# Patient Record
Sex: MALE | Race: WHITE | NOT HISPANIC OR LATINO
[De-identification: names, ages, dates, MRNs, and addresses within clinical notes are randomized per-mention and may not be internally consistent; named-entity substitution may affect disease eponyms.]

---

## 2018-02-13 ENCOUNTER — RX RENEWAL (OUTPATIENT)
Age: 53
End: 2018-02-13

## 2018-02-15 ENCOUNTER — FORM ENCOUNTER (OUTPATIENT)
Age: 53
End: 2018-02-15

## 2018-02-16 ENCOUNTER — OUTPATIENT (OUTPATIENT)
Dept: OUTPATIENT SERVICES | Facility: HOSPITAL | Age: 53
LOS: 1 days | End: 2018-02-16
Payer: COMMERCIAL

## 2018-02-16 ENCOUNTER — APPOINTMENT (OUTPATIENT)
Dept: ORTHOPEDIC SURGERY | Facility: CLINIC | Age: 53
End: 2018-02-16
Payer: SELF-PAY

## 2018-02-16 VITALS
WEIGHT: 175 LBS | BODY MASS INDEX: 25.05 KG/M2 | HEIGHT: 70 IN | DIASTOLIC BLOOD PRESSURE: 80 MMHG | SYSTOLIC BLOOD PRESSURE: 120 MMHG

## 2018-02-16 PROCEDURE — 73564 X-RAY EXAM KNEE 4 OR MORE: CPT

## 2018-02-16 PROCEDURE — 73564 X-RAY EXAM KNEE 4 OR MORE: CPT | Mod: 26,50

## 2018-02-16 PROCEDURE — 99214 OFFICE O/P EST MOD 30 MIN: CPT

## 2018-02-22 ENCOUNTER — APPOINTMENT (OUTPATIENT)
Dept: ORTHOPEDIC SURGERY | Facility: CLINIC | Age: 53
End: 2018-02-22
Payer: COMMERCIAL

## 2018-02-22 VITALS
BODY MASS INDEX: 25.05 KG/M2 | WEIGHT: 175 LBS | DIASTOLIC BLOOD PRESSURE: 70 MMHG | SYSTOLIC BLOOD PRESSURE: 120 MMHG | HEIGHT: 70 IN

## 2018-02-22 PROCEDURE — 99214 OFFICE O/P EST MOD 30 MIN: CPT | Mod: 25

## 2018-02-22 PROCEDURE — 20610 DRAIN/INJ JOINT/BURSA W/O US: CPT | Mod: LT

## 2019-09-06 ENCOUNTER — RX RENEWAL (OUTPATIENT)
Age: 54
End: 2019-09-06

## 2019-09-17 ENCOUNTER — APPOINTMENT (OUTPATIENT)
Dept: ORTHOPEDIC SURGERY | Facility: CLINIC | Age: 54
End: 2019-09-17

## 2019-10-22 ENCOUNTER — APPOINTMENT (OUTPATIENT)
Dept: ORTHOPEDIC SURGERY | Facility: CLINIC | Age: 54
End: 2019-10-22
Payer: COMMERCIAL

## 2019-10-22 VITALS
BODY MASS INDEX: 25.2 KG/M2 | SYSTOLIC BLOOD PRESSURE: 120 MMHG | HEIGHT: 70 IN | WEIGHT: 176 LBS | DIASTOLIC BLOOD PRESSURE: 70 MMHG

## 2019-10-22 DIAGNOSIS — M23.8X9 OTHER INTERNAL DERANGEMENTS OF UNSPECIFIED KNEE: ICD-10-CM

## 2019-10-22 PROCEDURE — 20610 DRAIN/INJ JOINT/BURSA W/O US: CPT | Mod: RT

## 2019-10-22 RX ORDER — HYALURONATE SOD, CROSS-LINKED 30 MG/3 ML
30 SYRINGE (ML) INTRAARTICULAR
Qty: 1 | Refills: 0 | Status: COMPLETED | COMMUNITY
Start: 2019-09-06 | End: 2019-10-22

## 2019-10-22 RX ORDER — HYALURONATE SOD, CROSS-LINKED 30 MG/3 ML
30 SYRINGE (ML) INTRAARTICULAR
Qty: 3 | Refills: 0 | Status: DISCONTINUED | COMMUNITY
Start: 2018-02-13 | End: 2019-10-22

## 2019-10-23 NOTE — PROCEDURE
[de-identified] : Indication: Right knee pain and stiffness\par \par Under strict sterile technique, the right  knee was prepped with Betadine. Using the superolateral approach, with the patient supine, a 3mL injection of GelOne was administered intra-articularly. The patient tolerated the procedure well. The patient was instructed to avoid vigorous exercise for 48 hours and will apply ice to the knee for 20 minutes 2-3 times per day if discomfort occurs. Patient will return on an as needed basis. The patient will call if any questions or problems should arise. \par \par Gel-One injection - Right knee Joint\par Lot #: [id number]\par Exp: 08-\par Man: Ry\par NDC: [id number]

## 2020-05-21 ENCOUNTER — APPOINTMENT (OUTPATIENT)
Dept: ORTHOPEDIC SURGERY | Facility: CLINIC | Age: 55
End: 2020-05-21
Payer: SELF-PAY

## 2020-05-21 ENCOUNTER — OUTPATIENT (OUTPATIENT)
Dept: OUTPATIENT SERVICES | Facility: HOSPITAL | Age: 55
LOS: 1 days | End: 2020-05-21
Payer: COMMERCIAL

## 2020-05-21 ENCOUNTER — RESULT REVIEW (OUTPATIENT)
Age: 55
End: 2020-05-21

## 2020-05-21 VITALS — TEMPERATURE: 98 F

## 2020-05-21 DIAGNOSIS — M25.462 EFFUSION, LEFT KNEE: ICD-10-CM

## 2020-05-21 PROCEDURE — 73564 X-RAY EXAM KNEE 4 OR MORE: CPT | Mod: 26,RT,76

## 2020-05-21 PROCEDURE — 73564 X-RAY EXAM KNEE 4 OR MORE: CPT

## 2020-05-21 PROCEDURE — 99213 OFFICE O/P EST LOW 20 MIN: CPT

## 2020-05-21 NOTE — DISCUSSION/SUMMARY
[de-identified] : Arvind has persistent medial knee pain and limitations in his ADLs. He will be sent for an MRI to r.o a medial meniscus tear. we will call him with the results. He will continue to cycle and will avoid running for now. ALl questions were answered. He will call if any issues arise.

## 2020-05-21 NOTE — HISTORY OF PRESENT ILLNESS
[de-identified] : Arvind returns today for evaluation of his left knee. 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 He is na avid runner and has increased his running during the Covid pandemic. He developed increased medial knee pain and stiffness in his left knee. He has switched from runnign to biking and has no pain while cycyling but does have medial knee pain when uncliping from his pedals. He denies any locking or buckling. He has pain with stairs.

## 2020-05-21 NOTE — PHYSICAL EXAM
[de-identified] : The patient is a well developed, well nourished male in no apparent distress. He is alert and oriented X 3 with a pleasant mood and appropriate affect. \par \par On physical examination of the left knee, his ROM Is 0-125 degrees. The patient walks with a normal gait and stands in neutral alignment. There is no effusion. No warmth or erythema is noted. The patella is tender to palpation medially . There is no crepitus noted. The apprehension and grind tests are negative. The extensor mechanism is intact. There is medial joint line tenderness. The Haritha sign is negative. The Lachman and pivot shift tests are negative. There is no varus or valgus laxity at 0 or 30 degrees. No posterolateral or anteromedial laxity is noted. No masses are palpable. No other soft tissue or bony tenderness is noted. Quadriceps weakness is noted. Neurovascular function is intact.   [de-identified] : Radiographs show medial joint space narrowing in the right knee

## 2020-07-02 ENCOUNTER — APPOINTMENT (OUTPATIENT)
Dept: ORTHOPEDIC SURGERY | Facility: CLINIC | Age: 55
End: 2020-07-02
Payer: SELF-PAY

## 2020-07-02 VITALS
OXYGEN SATURATION: 98 % | BODY MASS INDEX: 25.05 KG/M2 | HEIGHT: 70 IN | HEART RATE: 56 BPM | TEMPERATURE: 97.6 F | WEIGHT: 175 LBS

## 2020-07-02 DIAGNOSIS — S83.232A COMPLEX TEAR OF MEDIAL MENISCUS, CURRENT INJURY, LEFT KNEE, INITIAL ENCOUNTER: ICD-10-CM

## 2020-07-02 PROCEDURE — 20610 DRAIN/INJ JOINT/BURSA W/O US: CPT | Mod: LT

## 2020-07-02 PROCEDURE — 99213 OFFICE O/P EST LOW 20 MIN: CPT | Mod: 25

## 2020-07-07 PROBLEM — S83.232A COMPLEX TEAR OF MEDIAL MENISCUS OF LEFT KNEE AS CURRENT INJURY, INITIAL ENCOUNTER: Status: ACTIVE | Noted: 2018-03-02

## 2020-07-07 NOTE — HISTORY OF PRESENT ILLNESS
[de-identified] : Arvidn returns today for evaluation of his left knee. He had his MRI which shows a medial meniscus tear. he reports some improvement in his pain. he has decreased his riunnign and reports some improvement in his medial knee pain. He denies any locking or buckling.

## 2020-07-07 NOTE — PROCEDURE
[de-identified] : Under strict sterile technique, the left  knee was prepped with Betadine. Using the superolateral approach, with the patient supine, 1ml of Kenalog was mixed with 5mls of 1% Lidocaine and 5mLs of 0.5% Marcaine, and was injected intraarticularly. The patient tolerated the procedure well. The patient was instructed to avoid vigorous exercise for 24 hours and will apply ice to the knee for 20 minutes 2-3 times per day if discomfort occurs. Patient will return on an as needed basis. The patient will call if any questions or problems should arise

## 2020-07-07 NOTE — PHYSICAL EXAM
[de-identified] : The patient is a well developed, well nourished male in no apparent distress. He is alert and oriented X 3 with a pleasant mood and appropriate affect. \par \par On physical examination of the left knee, his ROM Is 0-125 degrees. The patient walks with a normal gait and stands in neutral alignment. There is no effusion. No warmth or erythema is noted. The patella is tender to palpation medially . There is no crepitus noted. The apprehension and grind tests are negative. The extensor mechanism is intact. There is medial joint line tenderness. The Haritha sign is negative. The Lachman and pivot shift tests are negative. There is no varus or valgus laxity at 0 or 30 degrees. No posterolateral or anteromedial laxity is noted. No masses are palpable. No other soft tissue or bony tenderness is noted. Quadriceps weakness is noted. Neurovascular function is intact.   [de-identified] : MRi of the left knee shows a horizontal tear in the body of the medial mensicus

## 2020-07-07 NOTE — DISCUSSION/SUMMARY
[de-identified] : Arvind has a symptomatic medial meniscus tear in his left knee. He received a cortisone injection today. He will slowly increase his activities as tolerated. If unimproved we will consider arthroscopy in the future. He understands that there is a potential for him to have DJD in the future. All questions were answered. he will call if any issues arise,

## 2022-08-18 ENCOUNTER — OUTPATIENT (OUTPATIENT)
Dept: OUTPATIENT SERVICES | Facility: HOSPITAL | Age: 57
LOS: 1 days | End: 2022-08-18
Payer: SELF-PAY

## 2022-08-18 ENCOUNTER — APPOINTMENT (OUTPATIENT)
Dept: ORTHOPEDIC SURGERY | Facility: CLINIC | Age: 57
End: 2022-08-18

## 2022-08-18 ENCOUNTER — RESULT REVIEW (OUTPATIENT)
Age: 57
End: 2022-08-18

## 2022-08-18 VITALS
WEIGHT: 180 LBS | TEMPERATURE: 98.2 F | OXYGEN SATURATION: 97 % | HEART RATE: 56 BPM | BODY MASS INDEX: 25.77 KG/M2 | DIASTOLIC BLOOD PRESSURE: 89 MMHG | HEIGHT: 70 IN | SYSTOLIC BLOOD PRESSURE: 136 MMHG

## 2022-08-18 DIAGNOSIS — Z72.89 OTHER PROBLEMS RELATED TO LIFESTYLE: ICD-10-CM

## 2022-08-18 DIAGNOSIS — M16.10 UNILATERAL PRIMARY OSTEOARTHRITIS, UNSPECIFIED HIP: ICD-10-CM

## 2022-08-18 DIAGNOSIS — Z85.828 PERSONAL HISTORY OF OTHER MALIGNANT NEOPLASM OF SKIN: ICD-10-CM

## 2022-08-18 DIAGNOSIS — Z78.9 OTHER SPECIFIED HEALTH STATUS: ICD-10-CM

## 2022-08-18 DIAGNOSIS — Z85.9 PERSONAL HISTORY OF MALIGNANT NEOPLASM, UNSPECIFIED: ICD-10-CM

## 2022-08-18 PROCEDURE — 20610 DRAIN/INJ JOINT/BURSA W/O US: CPT | Mod: 50

## 2022-08-18 PROCEDURE — 73564 X-RAY EXAM KNEE 4 OR MORE: CPT

## 2022-08-18 PROCEDURE — 73564 X-RAY EXAM KNEE 4 OR MORE: CPT | Mod: 26,50

## 2022-08-18 PROCEDURE — 99214 OFFICE O/P EST MOD 30 MIN: CPT | Mod: 25

## 2022-08-19 PROBLEM — Z85.828 HISTORY OF MALIGNANT NEOPLASM OF SKIN: Status: RESOLVED | Noted: 2022-08-18 | Resolved: 2022-08-19

## 2022-08-19 PROBLEM — Z78.9 ALCOHOL CONSUMPTION OF MORE THAN FOUR DRINKS PER WEEK: Status: ACTIVE | Noted: 2022-08-19

## 2022-08-19 PROBLEM — M16.10 ARTHRITIS, HIP: Status: RESOLVED | Noted: 2022-08-18 | Resolved: 2022-08-19

## 2022-08-19 PROBLEM — Z72.89 ALCOHOL USE: Status: ACTIVE | Noted: 2022-08-18

## 2022-08-19 NOTE — PROCEDURE
[de-identified] : Under strict sterile technique, the right knee was prepped with Betadine. Using the superolateral approach, with the patient supine, 1ml of Kenalog was mixed with 5mls of 1% Lidocaine and 5mLs of 0.5% Marcaine, and was injected intraarticularly. The patient tolerated the procedure well. The patient was instructed to avoid vigorous exercise for 24 hours and will apply ice to the knee for 20 minutes 2-3 times per day if discomfort occurs. Patient will return on an as needed basis. The patient will call if any questions or problems should arise

## 2022-08-19 NOTE — PHYSICAL EXAM
[de-identified] : The patient is a well developed, well nourished male in no apparent distress. He is alert and oriented X 3 with a pleasant mood and appropriate affect. \par \par On physical examination of the left knee, his ROM Is 0-125 degrees. The patient walks with a normal gait and stands in neutral alignment. There is no effusion. No warmth or erythema is noted. The patella is tender to palpation medially . There is no crepitus noted. The apprehension and grind tests are negative. The extensor mechanism is intact. There is medial joint line tenderness. The Haritha sign is negative. The Lachman and pivot shift tests are negative. There is no varus or valgus laxity at 0 or 30 degrees. No posterolateral or anteromedial laxity is noted. No masses are palpable. No other soft tissue or bony tenderness is noted. Quadriceps weakness is noted. Neurovascular function is intact.  \par \par \par On physical examination of the right knee, his ROM Is 0-125 degrees. The patient walks with a normal gait and stands in neutral alignment. There is trace  effusion. No warmth or erythema is noted. The patella is tender to palpation medially . There is no crepitus noted. The apprehension and grind tests are negative. The extensor mechanism is intact. There is medial joint line tenderness. The Haritha sign is negative. The Lachman and pivot shift tests are negative. There is no varus or valgus laxity at 0 or 30 degrees. No posterolateral or anteromedial laxity is noted. No masses are palpable. No other soft tissue or bony tenderness is noted. Quadriceps weakness is noted. Neurovascular function is intact.  \par \par \par  [de-identified] : Radiographs of both knees performed today show moderate to severe medial compartment DJD in his right knee and moderate medial compartment DJD (with evidence of subchondral healed subchondral fracture MFC)

## 2022-08-19 NOTE — DISCUSSION/SUMMARY
[de-identified] : Arvind has symptomatic medial compartment DJD in both knees but his right knee is more symptomatic. He received a cortisone injection today. He was given an RX for a medial  brace to wear for sports. He will consider HA injections again in the future. all questions were answered. HE will call if any issues arise

## 2022-08-19 NOTE — HISTORY OF PRESENT ILLNESS
[de-identified] : Arvind returns today for evaluation of his right knee. He reports progressive medial knee pain and stiffness in both knees. He continues to run and cycle and stand up paddle board but with increasing pain. His right knee is symptomatic. He has swelling and decreased ROM. He denies any locking or buckling. He has some discomfort on stairs. He denies any locking or buckling\par \par

## 2022-08-19 NOTE — END OF VISIT
[FreeTextEntry3] : All medical record entries made by XAVIER Hickey, acting as a scribe for this encounter under the direction of Krzysztof Ramirez MD . I have reviewed the chart and agree that the record accurately reflects my personal performance of the history, physical exam, assessment and plan. I have also personally directed, reviewed, and agreed with the chart

## 2022-11-22 ENCOUNTER — RESULT REVIEW (OUTPATIENT)
Age: 57
End: 2022-11-22

## 2022-11-23 ENCOUNTER — NON-APPOINTMENT (OUTPATIENT)
Age: 57
End: 2022-11-23

## 2022-11-28 ENCOUNTER — APPOINTMENT (OUTPATIENT)
Dept: ORTHOPEDIC SURGERY | Facility: CLINIC | Age: 57
End: 2022-11-28

## 2022-11-28 ENCOUNTER — OUTPATIENT (OUTPATIENT)
Dept: OUTPATIENT SERVICES | Facility: HOSPITAL | Age: 57
LOS: 1 days | End: 2022-11-28
Payer: COMMERCIAL

## 2022-11-28 VITALS
RESPIRATION RATE: 16 BRPM | BODY MASS INDEX: 25.05 KG/M2 | OXYGEN SATURATION: 97 % | DIASTOLIC BLOOD PRESSURE: 100 MMHG | HEIGHT: 70 IN | HEART RATE: 66 BPM | SYSTOLIC BLOOD PRESSURE: 152 MMHG | TEMPERATURE: 97.4 F | WEIGHT: 175 LBS

## 2022-11-28 DIAGNOSIS — Z82.49 FAMILY HISTORY OF ISCHEMIC HEART DISEASE AND OTHER DISEASES OF THE CIRCULATORY SYSTEM: ICD-10-CM

## 2022-11-28 DIAGNOSIS — M70.62 TROCHANTERIC BURSITIS, LEFT HIP: ICD-10-CM

## 2022-11-28 DIAGNOSIS — Z83.3 FAMILY HISTORY OF DIABETES MELLITUS: ICD-10-CM

## 2022-11-28 DIAGNOSIS — M17.0 BILATERAL PRIMARY OSTEOARTHRITIS OF KNEE: ICD-10-CM

## 2022-11-28 PROCEDURE — 77073 BONE LENGTH STUDIES: CPT

## 2022-11-28 PROCEDURE — 99215 OFFICE O/P EST HI 40 MIN: CPT

## 2022-11-28 PROCEDURE — 99072 ADDL SUPL MATRL&STAF TM PHE: CPT

## 2022-11-28 PROCEDURE — 77073 BONE LENGTH STUDIES: CPT | Mod: 26

## 2022-11-28 PROCEDURE — 73564 X-RAY EXAM KNEE 4 OR MORE: CPT

## 2022-11-28 PROCEDURE — 73564 X-RAY EXAM KNEE 4 OR MORE: CPT | Mod: 26,LT,76

## 2022-11-28 NOTE — REVIEW OF SYSTEMS
[Negative] : Heme/Lymph [Arthralgia] : arthralgia [Joint Pain] : joint pain [Joint Stiffness] : joint stiffness [Joint Swelling] : joint swelling [FreeTextEntry9] : arthritis

## 2022-11-28 NOTE — DISCUSSION/SUMMARY
[de-identified] : Assessment:\par \par Right Knee:\par -Right Knee pain Medially\par -Right moderate medial compartment osteoarthritis \par -X-rays show moderate medial compartment joint space loss in the right knee\par -ACL and PCL intact\par -No lateral or patellofemoral pain on exam.\par -Failed conservative treatment\par -History of knee arthroscopy and medial meniscectomy.\par \par \par Plan: \par \par - The patient would like to consider a Partial Knee Replacement \par - We have given him our information to take home and discuss with his family.\par - The procedure would be Yuniel Robotic-Arm Assisted, with cemented Restoris MCK implants from [employer].\par - The side, Right.\par - Compartment, Medial \par - The patient will discuss surgery scheduling with our coordinator\par \par \par Discussion Partial Knee Replacement:\par \par I had a discussion with the patient regarding the findings of their history, exam and imaging. We discussed the option of Partial Knee Replacement using the precision Yuniel™ Robotic-Arm System. We discussed the indications, surgical process, my techniques of surgery, the probable post-operative course and instructions, and the risks and benefits of the procedure. The patient had their questions answered to their satisfaction. Although there are no guarantees to success, I feel that the surgery would be very beneficial and there is an excellent chance for a positive outcome.\par \par The risks of this procedure include but are not limited to the risks of anesthesia, heart attack, stroke, respiratory complications, wound healing problems, skin blisters, delayed wound healing, infection, bleeding, nerve damage, vessel damage, skin sensory changes next to the incisions, loss of motion, scar tissue formation requiring further treatment, blood clots, heterotopic bone formation, implant wear, implant loosening after surgery, allergic responses to the implant materials, continued pain despite proper implant placement, soft tissue pain, continued knee swelling, with partial knee replacement there can be progression of arthritis into the other compartments requiring future surgery, and the possible need for further surgery in the future for scar tissue removal, plastic insert change over time, addition of other compartment replacements, and possible revision to total knee replacement.\par \par I look forward to the opportunity to help Arvind with his painful knee condition.\par

## 2022-11-28 NOTE — CONSULT LETTER
[Dear  ___] : Dear  [unfilled], [Courtesy Letter:] : I had the pleasure of seeing your patient, [unfilled], in my office today. [Please see my note below.] : Please see my note below. [Sincerely,] : Sincerely, [FreeTextEntry1] : Arvind is a great candidate for a medial partial knee replacement.\par We had a great conversation and exam today.\par He  is looking to schedule this asap.\par \par I will keep you posted.\par \par  [FreeTextEntry3] : Brayan\par Fredi Gregg Jr. MD\par

## 2022-11-28 NOTE — HISTORY OF PRESENT ILLNESS
[de-identified] : Arvind is a very pleasant 57-year-old male with over 5 years of pain in his right knee on the medial side.  He has been under the excellent care of Dr. Krzysztof Taylor MD for several years now.  He has had multiple injections of cortisone and hyaluronic acid to help with his painful knee symptoms.  He has also had a knee arthroscopy and medial meniscectomy in the past.  He is an avid athlete and runner and has run 10 marathons in his day.  He has run the [place] marathon twice and was present and about 40 yards away from the bombing at the [employer].\par His right knee pain is a 6 out of 10 pain score.  He has aching and sharp pain that is getting worse.  He has pain with walking walking after sitting, running, turning and twisting, squatting, lunges, rising from a seat, getting on and off toilets, going up and down stairs, and getting in and out of cars.\par \par He also notices swelling in the knee, loss of motion, grinding and crunching feelings when he first gets up and takes his steps, and he has a decreased walk distance.\par \par The pain is located on the inner medial side of the knee and in the posterior medial side of the knee.\par He is here to discuss the possibility of a medial partial knee replacement on the recommendation of Dr. Taylor.\par \par He also has osteoarthritis of both hips, worse on the left side, and left trochanteric bursitis.\par

## 2022-11-28 NOTE — PHYSICAL EXAM
[de-identified] : Right Knee Exam: \par \par Skin: Normal. No erythema, no ecchymosis, no abrasions, no scratches, no tattoos.  \par                 \par Old Incisions:  Healed arthroscopy portals. \par \par Knee Joint Swelling: Positive effusion. Mild \par \par Popliteal Swelling: None	\par \par Pre-Patella Bursa Swelling: None. \par \par Alignment: 		        \par Varus, Mild Moderate\par Passively correctable to near neutral.  \par \par Knee Joint Line Tenderness: \par Tender at medial joint line. \par Not tender at the lateral joint line.   \par Not tender in the patellofemoral compartment. \par \par Soft Tissue Tenderness: None. \par \par Medial Collateral Ligament Laxity:  Good endpoint.                                                       \par Medial opening to valgus stress.   Moderate.    \par \par Lateral Collateral Ligament Laxity:          \par Normal laxity. Good endpoint. \par \par ROM Extension: 0 degrees.   \par ROM Flexion: 125+ degrees.   \par \par ACL Testing: 			\par Stable ACL. Good Endpoint.                                                                \par Lachman Test: Negative \par Anterior Drawer Test: Negative \par \par PCL Testing: 			\par Stable PCL.  Good Endpoint.                                                               \par Posterior Drawer Test: Negative \par \par Patella Compression Test: Negative \par \par Patellofemoral Crepitus: None.   \par \par Patellofemoral Apprehension Testing: Negative. \par \par Patellofemoral Laxity: Normal.\par \par Motor Strength: 			\par Quadriceps strength is 5 out of 5                                                                \par Hamstring strength is 5 out of 5                                                               \par Ankle dorsiflexion strength is 5 out of 5                                                              \par Ankle plantarflexion strength is 5 out of 5 \par \par Sensation: Light tough sensation in the lower extremity is grossly normal.  \par                                    \par Pulses: 				\par Pulses are palpable at the ankle at the Dorsalis Pedis Artery.                                                               \par Pulses are palpable at the Posterior Tibialis Artery.                                                                               \par \par Hip Motion: The patient has painful hip range of motion on the left with 0 internal rotation and 25 degrees of external rotation. Right hip has 5 degrees of internal rotation and 35 degrees of external rotation with less pain than the left side.                                                \par \par Hip Tenderness: The patient no Greater Trochanteric hip bursa tenderness on the left not the right.                                                              \par \par Lumbar Spine Symptoms: Negative straight leg raise.                                                               \par \par Gait: Limping Gait.  Abnormal gait.  \par \par Assistive Devices: 	None\par \par \par  [de-identified] : X-ray of the Right Knee:\par \par AP Standing View:\par Medial joint space loss. Severe to Moderate.\par Lateral joint space preserved.\par \par PA Flexion View:\par Medial joint space loss. Severe.\par Lateral joint space preserved.\par \par \par Lateral View: \par Patellofemoral joint space preserved.\par \par Delleker View: \par Patellofemoral joint space is preserved.\par Patellofemoral joint central tracking.\par \par Bilateral Hip to Ankle Standing View:\par Alignment: Varus\par Medial joint space loss. Moderate bilaterally.\par Hips: Severe superior lateral joint space loss left greater that right.\par \par

## 2022-12-12 DIAGNOSIS — Z01.812 ENCOUNTER FOR PREPROCEDURAL LABORATORY EXAMINATION: ICD-10-CM

## 2022-12-14 ENCOUNTER — APPOINTMENT (OUTPATIENT)
Dept: ORTHOPEDIC SURGERY | Facility: CLINIC | Age: 57
End: 2022-12-14

## 2022-12-14 ENCOUNTER — APPOINTMENT (OUTPATIENT)
Dept: CT IMAGING | Facility: CLINIC | Age: 57
End: 2022-12-14

## 2022-12-14 VITALS
WEIGHT: 175 LBS | HEART RATE: 76 BPM | OXYGEN SATURATION: 96 % | SYSTOLIC BLOOD PRESSURE: 145 MMHG | TEMPERATURE: 98.2 F | RESPIRATION RATE: 16 BRPM | DIASTOLIC BLOOD PRESSURE: 92 MMHG | BODY MASS INDEX: 34.36 KG/M2 | HEIGHT: 60 IN

## 2022-12-14 DIAGNOSIS — M17.11 UNILATERAL PRIMARY OSTEOARTHRITIS, RIGHT KNEE: ICD-10-CM

## 2022-12-14 DIAGNOSIS — M25.561 PAIN IN RIGHT KNEE: ICD-10-CM

## 2022-12-14 PROCEDURE — 99214 OFFICE O/P EST MOD 30 MIN: CPT

## 2022-12-14 PROCEDURE — 99072 ADDL SUPL MATRL&STAF TM PHE: CPT

## 2022-12-14 NOTE — DISCUSSION/SUMMARY
[de-identified] : Assessment:\par \par 1. Medial compartment osteoarthritis of the knee, Right\par 2. Surgery scheduled for next week at [facility] as an outpatient.\par 3. Physical Exam reviewed and consistent with medial compartment painful knee osteoarthritis\par 4. Imaging reviewed showing moderate to severe medial compartment Joint space loss on the right.\par \par Plan:\par \par 1. The patient will proceed with Medial  Partial Knee Replacement\par 2. The side, Right.\par 3. The procedure will be Yuniel™ Robotic Arm Assisted with [employer] Restoris MCK cemented implants \par 4. We will schedule a Post-Op office visit for after surgery to change dressings and review surgery details.\par 5. We will Schedule the Yuniel™ Robotic-Arm System with the OR \par 6. The CT scan with Yuniel™ Knee Protocol with Radiology has been completed.\par 7. Medical Clearance has been completed.\par 8. Scheduled PRP in the Operating Room with vendor \par 9. Location will be [facility].\par 10. I have had an informed consent discussion with the patient & documentation was provided\par \par \par Discussion Partial Knee Replacement:\par \par I had a discussion with the patient regarding the findings of their history, exam and imaging. We discussed the option of Partial Knee Replacement using the precision Yuniel™ Robotic-Arm System. We discussed the indications, surgical process, my techniques of surgery, the probable post-operative course and instructions, and the risks and benefits of the procedure. The patient had their questions answered to their satisfaction.\par \par Although there are no guarantees to success, I feel that the surgery would be very beneficial and there is an excellent chance for a positive outcome.\par \par The risks of this procedure include but are not limited to the risks of anesthesia, heart attack, stroke, respiratory complications, wound healing problems, skin blisters, delayed wound healing, infection, bleeding, nerve damage, vessel damage, skin sensory changes next to the incisions, loss of motion, scar tissue formation requiring further treatment, blood clots, heterotopic bone formation, implant wear, implant loosening after surgery, allergic responses to the implant materials, continued pain despite proper implant placement, soft tissue pain, continued knee swelling, with partial knee replacement there can be progression of arthritis into the other compartments requiring future surgery, and the possible need for further surgery in the future for scar tissue removal, plastic insert change over time, addition of other compartment replacements, and possible revision to total knee replacement.\par \par I look forward to the opportunity to help this patient with their painful knee condition.\par \par

## 2022-12-14 NOTE — HISTORY OF PRESENT ILLNESS
[de-identified] : Arvind is a very pleasant 57-year-old male with over 5 years of pain in his right knee on the medial side.  \par He is here today for his preoperative visit.\par Chief Complaint / Diagnosis: Right Medial Knee Pain / Osteoarthritis Medial Compartment\par \par Planned Procedure: Partial Knee Replacement, Right Knee, Medial Side\par \par Summary of Pre-operative Consultation:\par \par - The patient is a 57-year-old with right medial knee pain from osteoarthritis. \par - The patient has had pain for 5 or more years worsening and impacting function and quality of life. \par - Non-surgical treatments and non-arthroplasty procedures have not provided sufficient pain or symptom relief from the knee arthritis.\par - The planned procedure the patient was seen here for today was a right medial partial knee replacement. \par    (Yuniel Robotic Arm Assisted, which is my area of expertise).\par - The procedure will be performed at [facility] in [place]. \par - The procedure will be on Tuesday 12.20.2022.\par - The procedure will be performed as an outpatient.\par - The medical clearance was completed.\par - The patient has completed all the steps to prepare for the procedure. \par - The patient has been cleared for surgery. \par - We reviewed the knee imaging today and confirmed the physical exam findings. \par - We discussed procedure details including the benefits and risks and the post-operative process.\par \par Right Knee History: (previous consultation information \par \par He has been under the excellent care of Dr. Krzysztof Taylor MD for several years now.  He has had multiple injections of cortisone and hyaluronic acid to help with his painful knee symptoms.  He has also had a knee arthroscopy and medial meniscectomy in the past.  He is an avid athlete and runner and has run 10 marathons in his day.  He has run the [place] marathon twice and was present and about 40 yards away from the bombing at the [employer].\par His right knee pain is a 6 out of 10 pain score.  He has aching and sharp pain that is getting worse.  He has pain with walking walking after sitting, running, turning and twisting, squatting, lunges, rising from a seat, getting on and off toilets, going up and down stairs, and getting in and out of cars.\par \par He also notices swelling in the knee, loss of motion, grinding and crunching feelings when he first gets up and takes his steps, and he has a decreased walk distance.\par \par The pain is located on the inner medial side of the knee and in the posterior medial side of the knee.\par He is here to discuss the possibility of a medial partial knee replacement on the recommendation of Dr. Taylor.\par \par He also has osteoarthritis of both hips, worse on the left side, and left trochanteric bursitis.\par

## 2022-12-14 NOTE — PHYSICAL EXAM
[de-identified] : Right Knee Exam: \par \par Skin: Normal. No erythema, no ecchymosis, no abrasions, no scratches, no tattoos.  \par                 \par Old Incisions:  Healed arthroscopy portals. \par \par Knee Joint Swelling: Positive effusion. Mild \par \par Popliteal Swelling: None	\par \par Pre-Patella Bursa Swelling: None. \par \par Alignment: 		        \par Varus, Mild Moderate\par Passively correctable to near neutral.  \par \par Knee Joint Line Tenderness: \par Tender at medial joint line. \par Not tender at the lateral joint line.   \par Not tender in the patellofemoral compartment. \par \par Soft Tissue Tenderness: None. \par \par Medial Collateral Ligament Laxity:  Good endpoint.                                                       \par Medial opening to valgus stress.   Moderate.    \par \par Lateral Collateral Ligament Laxity:          \par Normal laxity. Good endpoint. \par \par ROM Extension: 0 degrees.   \par ROM Flexion: 125+ degrees.   \par \par ACL Testing: 			\par Stable ACL. Good Endpoint.                                                                \par Lachman Test: Negative \par Anterior Drawer Test: Negative \par \par PCL Testing: 			\par Stable PCL.  Good Endpoint.                                                               \par Posterior Drawer Test: Negative \par \par Patella Compression Test: Negative \par \par Patellofemoral Crepitus: None.   \par \par Patellofemoral Apprehension Testing: Negative. \par \par Patellofemoral Laxity: Normal.\par \par Motor Strength: 			\par Quadriceps strength is 5 out of 5                                                                \par Hamstring strength is 5 out of 5                                                               \par Ankle dorsiflexion strength is 5 out of 5                                                              \par Ankle plantarflexion strength is 5 out of 5 \par \par Sensation: Light tough sensation in the lower extremity is grossly normal.  \par                                    \par Pulses: 				\par Pulses are palpable at the ankle at the Dorsalis Pedis Artery.                                                               \par Pulses are palpable at the Posterior Tibialis Artery.                                                                               \par \par Hip Motion: The patient has painful hip range of motion on the left with 0 internal rotation and 25 degrees of external rotation. Right hip has 5 degrees of internal rotation and 35 degrees of external rotation with less pain than the left side.                                                \par \par Hip Tenderness: The patient no Greater Trochanteric hip bursa tenderness on the left not the right.                                                              \par \par Lumbar Spine Symptoms: Negative straight leg raise.                                                               \par \par Gait: Limping Gait.  Abnormal gait.  \par \par Assistive Devices: 	None\par \par \par  [de-identified] : X-ray of the Right Knee:\par \par AP Standing View:\par Medial joint space loss. Severe to Moderate.\par Lateral joint space preserved.\par \par PA Flexion View:\par Medial joint space loss. Severe.\par Lateral joint space preserved.\par \par \par Lateral View: \par Patellofemoral joint space preserved.\par \par [place] View: \par Patellofemoral joint space is preserved.\par Patellofemoral joint central tracking.\par \par Bilateral Hip to Ankle Standing View:\par Alignment: Varus\par Medial joint space loss. Moderate bilaterally.\par Hips: Severe superior lateral joint space loss left greater that right.\par \par

## 2022-12-16 LAB — SARS-COV-2 N GENE NPH QL NAA+PROBE: NOT DETECTED

## 2022-12-19 LAB
MRSA SPEC QL CULT: NOT DETECTED
STAPH AUREUS (SA): NOT DETECTED

## 2022-12-20 ENCOUNTER — APPOINTMENT (OUTPATIENT)
Age: 57
End: 2022-12-20
Payer: COMMERCIAL

## 2022-12-20 PROCEDURE — 27437 REVISE KNEECAP: CPT | Mod: RT

## 2022-12-20 PROCEDURE — 27446 REVISION OF KNEE JOINT: CPT | Mod: RT

## 2022-12-21 ENCOUNTER — APPOINTMENT (OUTPATIENT)
Dept: ORTHOPEDIC SURGERY | Facility: CLINIC | Age: 57
End: 2022-12-21

## 2022-12-21 ENCOUNTER — OUTPATIENT (OUTPATIENT)
Dept: OUTPATIENT SERVICES | Facility: HOSPITAL | Age: 57
LOS: 1 days | End: 2022-12-21
Payer: COMMERCIAL

## 2022-12-21 ENCOUNTER — RESULT REVIEW (OUTPATIENT)
Age: 57
End: 2022-12-21

## 2022-12-21 VITALS
DIASTOLIC BLOOD PRESSURE: 77 MMHG | HEART RATE: 67 BPM | SYSTOLIC BLOOD PRESSURE: 130 MMHG | WEIGHT: 175 LBS | RESPIRATION RATE: 16 BRPM | HEIGHT: 70 IN | BODY MASS INDEX: 25.05 KG/M2 | OXYGEN SATURATION: 96 %

## 2022-12-21 PROCEDURE — 73562 X-RAY EXAM OF KNEE 3: CPT | Mod: 26,RT

## 2022-12-21 PROCEDURE — 99024 POSTOP FOLLOW-UP VISIT: CPT

## 2022-12-21 PROCEDURE — 77073 BONE LENGTH STUDIES: CPT

## 2022-12-21 PROCEDURE — 77073 BONE LENGTH STUDIES: CPT | Mod: 26

## 2022-12-21 PROCEDURE — 73562 X-RAY EXAM OF KNEE 3: CPT

## 2022-12-21 NOTE — HISTORY OF PRESENT ILLNESS
[de-identified] : Ba  is a 57 year  old male who presents today for a post op visit.\par \par Post op Day: 1\par Surgery Type: Partial knee replacement medial\par Side of Surgery: Right knee\par Date of Surgery: 12/20/2022 \par \par Pain Level: 0\par Assistive Device: Walker \par Satisfaction Level: Very Satisfied\par Activities: Walking\par  [de-identified] : Arvind is a 57-year-old male who had a partial knee replacement on the medial compartment of his right knee yesterday on December 20th,2022.\par He is here today for his postop day 1 office visit.  He is walking with the use of a walker.  He has no pain in his right knee at this time.  His blocks are working perfectly still.  He slept very well last night.  He has no trouble walking with his walker.  He has all of his medications that were sent to the pharmacy for him, he has his cold therapy device which he used all night long, and he now has his supply of dressings to change once a week and his first dressing done today. [de-identified] : Right Knee Exam: \par  \par Skin:  Healthy appearing. No erythema. Minimal ecchymosis.  No drainage.  Dermabond intact.      \par \par Dressings: Dressings changed today and new dressings provided for changing in one week.      \par \par Knee Joint Swelling: Swelling. Mild. \par \par Alignment: Neutral.                                                        \par ROM Extension: 0 degrees.   \par ROM Flexion: 90 degrees.   \par \par Medial Collateral Ligament Laxity:  Normal laxity. Good endpoint. \par \par Lateral Collateral Ligament Laxity: Normal laxity. Good endpoint. \par \par Instability: No flexion or extension instability\par \par Anterior Drawer Test: No significant translation. Good endpoint. \par Posterior Drawer Test:  Stable with good endpoint \par \par Motor Strength: 	\par Quadriceps strength is 5 out of 5 \par Hamstring strength is 5 out of 5 \par Ankle dorsiflexion strength is 5 out of 5 \par Ankle plantarflexion strength is 5 out of 5 \par \par Sensation:  \par Light tough sensation in the lower extremity is grossly normal.  \par Sensory change is located lateral to incision as expected. \par \par Pulses: \par Pulses are palpable at the ankle at the Dorsalis Pedis Artery.  \par Pulses are palpable at the Posterior Tibialis Artery. \par                                                                 \par Gait: Limping Gait improving and expected after surgery.  Normal gait.\par  \par Assistive Devices: Walker .\par  [de-identified] : Post Op  X-Rays Partial Knee:\par \par Examination of the Knee: Left Right\par \par Views: AP, Lateral, Merchant, Hip to Ankle \par \par \par AP Standing View:\par Medial Partial Knee Replacement in good position.\par No signs of Loosening.\par No subsidence.\par No fracture.\par Good insert space thickness.\par \par Lateral View: \par Medial Partial  Knee Replacement in good position.\par Fix is anatomic on both femur and tibia.\par \par [place] View: \par Patellofemoral joint shows central tracking.\par Medial implant tracking is central on the tibial implant.\par \par Bilateral Hip to Ankle Standing View:\par Knee Alignment is in neutral position. with 2 degrees of varus on the right. 5 degrees on the left.\par  [de-identified] : Assessment:\par Post op Day 1 Right medial partial Knee.\par Patient doing great.\par No Pain.\par Walking with walker.\par Wound dry with no erythema.\par No Signs of DVT. [de-identified] : Plan:\par \par 1. Continue Post op protocol\par 2. Full Weight bearing.\par 3. Walker or crutches for safety until progressed by PT\par 4. Continue with ASA orally BID for 4 weeks for DVT prophylaxis.\par 5. Knee Sleeve provided and applied to patient's leg to reduce swelling to be worn for 4-6 weeks. May remove for showers and bedtime.\par 6. Finish oral antibiotics prophylaxis.\par 7. Tylenol and Ibuprofen for pain.\par 8. Hydrocondone or alternative provided for moderate to severe pain.\par 9. Use Cold Therapy as directed when not ambulating.\par 10. Begin home exercise program and PT.\par 11. Start Outpatient PT when able to get out of home preferably in 1 to 2 weeks.\par 12. Followup in 3 weeks.\par

## 2023-01-11 ENCOUNTER — APPOINTMENT (OUTPATIENT)
Dept: ORTHOPEDIC SURGERY | Facility: CLINIC | Age: 58
End: 2023-01-11
Payer: COMMERCIAL

## 2023-01-11 VITALS
DIASTOLIC BLOOD PRESSURE: 88 MMHG | OXYGEN SATURATION: 100 % | RESPIRATION RATE: 16 BRPM | HEART RATE: 57 BPM | SYSTOLIC BLOOD PRESSURE: 137 MMHG | HEIGHT: 70 IN

## 2023-01-11 PROCEDURE — 99024 POSTOP FOLLOW-UP VISIT: CPT

## 2023-01-11 NOTE — HISTORY OF PRESENT ILLNESS
[de-identified] : Ba  is a 57 year  old male who presents today for a post op visit.\par \par Post op Day: 22 days post op\par Surgery Type: Partial Knee Replacement Medial \par Side of Surgery: Right knee \par Date of Surgery: 12/20/2022 \par \par Pain Level: 1\par Assistive Device: None at this time\par Satisfaction Level: Very Satisfied\par \par Activities: Walking, Working out with PT, Working out independently.\par  [de-identified] : Arvind is a 57-year-old male who is now 3 weeks postop from a right medial robotic assisted partial knee replacement using the Yuniel system.  He is very satisfied with his result.  He feels that it is gotten much easier than he expected.  He is only taking 2 of his hydrocodone pain medicine since the time of surgery.  He has been using his ice machine regularly and doing physical therapy.  He had a little bit of confusion on the instructions and is only allowed himself to bend to 90 degrees up until this point said his range of motion is very good with full extension to 90 degrees but he needs to now start pushing his flexion.  His Dermabond was removed today and his wound is looking excellent there is no signs of infection and no drainage. [de-identified] : His right knee incision has no drainage.  There is no erythema and no ecchymosis.  The Dermabond was still intact and was removed today with rubbing alcohol by me.  He has full extension and flexes easily to 90 to 95 degrees.  He is stable to varus and valgus stress testing.  His ACL and PCL are intact.  He has good pulses in the foot he can dorsi and plantarflex his ankle against resistance.  His knee extension and flexion strength is 5/5.  He has some mild quadriceps atrophy on the right.  There is no signs of DVT with no calf pain or tenderness. [de-identified] : No x-rays were obtained today [de-identified] : \par - Post Op 3 weeks after Right Partial Knee Replacement, Medial compartment, Yuniel Robotic-Assisted.\par - Gait is significantly improved from preop.\par - Walking with no pain.\par - Incisions healing well.\par - No signs of infection.\par - No calf tenderness. No signs of DVT.\par - Range of Motion is: 0-90\par - No problem with stairs or entry and exit from cars.\par - Can walk without support.\par - Stable collateral ligament exam. No Flexion instability.\par - No X-rays today. \par - Patient is very satisfied with progress and outcome.\par \par \par  [de-identified] : \par 1. Continue to work on strengthening and gait\par 2. May D/C aspirin after this week\par 3. Follow-up 3 more weeks

## 2023-01-12 RX ORDER — CEPHALEXIN 500 MG/1
500 CAPSULE ORAL 4 TIMES DAILY
Qty: 20 | Refills: 1 | Status: DISCONTINUED | COMMUNITY
Start: 2022-12-14 | End: 2023-01-12

## 2023-01-12 RX ORDER — CLINDAMYCIN HYDROCHLORIDE 300 MG/1
300 CAPSULE ORAL
Qty: 20 | Refills: 1 | Status: ACTIVE | COMMUNITY
Start: 2023-01-12 | End: 1900-01-01

## 2023-01-12 RX ORDER — HYDROCODONE BITARTRATE AND ACETAMINOPHEN 7.5; 325 MG/1; MG/1
7.5-325 TABLET ORAL
Qty: 24 | Refills: 0 | Status: DISCONTINUED | COMMUNITY
Start: 2022-12-14 | End: 2023-01-12

## 2023-01-29 ENCOUNTER — TRANSCRIPTION ENCOUNTER (OUTPATIENT)
Age: 58
End: 2023-01-29

## 2023-02-01 ENCOUNTER — APPOINTMENT (OUTPATIENT)
Dept: ORTHOPEDIC SURGERY | Facility: CLINIC | Age: 58
End: 2023-02-01
Payer: COMMERCIAL

## 2023-02-01 ENCOUNTER — RESULT REVIEW (OUTPATIENT)
Age: 58
End: 2023-02-01

## 2023-02-01 ENCOUNTER — OUTPATIENT (OUTPATIENT)
Dept: OUTPATIENT SERVICES | Facility: HOSPITAL | Age: 58
LOS: 1 days | End: 2023-02-01
Payer: COMMERCIAL

## 2023-02-01 VITALS — BODY MASS INDEX: 20.76 KG/M2 | WEIGHT: 145 LBS | HEIGHT: 70 IN

## 2023-02-01 PROCEDURE — 73562 X-RAY EXAM OF KNEE 3: CPT | Mod: 26,RT

## 2023-02-01 PROCEDURE — 77073 BONE LENGTH STUDIES: CPT

## 2023-02-01 PROCEDURE — 77073 BONE LENGTH STUDIES: CPT | Mod: 26

## 2023-02-01 PROCEDURE — 99024 POSTOP FOLLOW-UP VISIT: CPT

## 2023-02-01 PROCEDURE — 73562 X-RAY EXAM OF KNEE 3: CPT

## 2023-03-29 ENCOUNTER — OUTPATIENT (OUTPATIENT)
Dept: OUTPATIENT SERVICES | Facility: HOSPITAL | Age: 58
LOS: 1 days | End: 2023-03-29
Payer: COMMERCIAL

## 2023-03-29 ENCOUNTER — APPOINTMENT (OUTPATIENT)
Dept: ORTHOPEDIC SURGERY | Facility: CLINIC | Age: 58
End: 2023-03-29
Payer: COMMERCIAL

## 2023-03-29 VITALS
TEMPERATURE: 98.1 F | WEIGHT: 180 LBS | DIASTOLIC BLOOD PRESSURE: 77 MMHG | BODY MASS INDEX: 25.77 KG/M2 | RESPIRATION RATE: 18 BRPM | HEIGHT: 70 IN | OXYGEN SATURATION: 97 % | HEART RATE: 65 BPM | SYSTOLIC BLOOD PRESSURE: 119 MMHG

## 2023-03-29 PROCEDURE — 73562 X-RAY EXAM OF KNEE 3: CPT

## 2023-03-29 PROCEDURE — 99072 ADDL SUPL MATRL&STAF TM PHE: CPT

## 2023-03-29 PROCEDURE — 77073 BONE LENGTH STUDIES: CPT

## 2023-03-29 PROCEDURE — 73562 X-RAY EXAM OF KNEE 3: CPT | Mod: 26,RT

## 2023-03-29 PROCEDURE — 77073 BONE LENGTH STUDIES: CPT | Mod: 26

## 2023-03-29 PROCEDURE — 99213 OFFICE O/P EST LOW 20 MIN: CPT

## 2023-07-10 ENCOUNTER — APPOINTMENT (OUTPATIENT)
Dept: ORTHOPEDIC SURGERY | Facility: CLINIC | Age: 58
End: 2023-07-10
Payer: COMMERCIAL

## 2023-07-12 ENCOUNTER — OUTPATIENT (OUTPATIENT)
Dept: OUTPATIENT SERVICES | Facility: HOSPITAL | Age: 58
LOS: 1 days | End: 2023-07-12
Payer: COMMERCIAL

## 2023-07-12 ENCOUNTER — APPOINTMENT (OUTPATIENT)
Dept: ORTHOPEDIC SURGERY | Facility: CLINIC | Age: 58
End: 2023-07-12
Payer: COMMERCIAL

## 2023-07-12 VITALS
RESPIRATION RATE: 16 BRPM | OXYGEN SATURATION: 98 % | BODY MASS INDEX: 25.77 KG/M2 | HEIGHT: 70 IN | WEIGHT: 180 LBS | SYSTOLIC BLOOD PRESSURE: 135 MMHG | TEMPERATURE: 97.2 F | DIASTOLIC BLOOD PRESSURE: 88 MMHG | HEART RATE: 66 BPM

## 2023-07-12 PROCEDURE — 99213 OFFICE O/P EST LOW 20 MIN: CPT

## 2023-07-12 PROCEDURE — 77073 BONE LENGTH STUDIES: CPT | Mod: 26

## 2023-07-12 PROCEDURE — 73562 X-RAY EXAM OF KNEE 3: CPT

## 2023-07-12 PROCEDURE — 73562 X-RAY EXAM OF KNEE 3: CPT | Mod: 26,RT

## 2023-07-12 PROCEDURE — 77073 BONE LENGTH STUDIES: CPT

## 2023-07-12 NOTE — DISCUSSION/SUMMARY
[de-identified] : Assessment:\par \par - Post Op 6 Months after Right Medial Partial Knee Replacements, Yuniel Robotic-Assisted.\par - Cemented components\par - Incision clean and dry with no drainage. \par - Incisions well Healed.\par - No signs of infection.\par - No calf tenderness. No signs of DVT.\par - Pain well controlled. Not taking narcotics.\par - Gait is very good.\par - Patient has now finished Physical Therapy.\par - Range of Motion is excellent.\par - Stable collateral ligament exam and cruciate ligament exam. \par - Patient is very satisfied with their progress and early outcome today and will continue to work on improving strength and flexibility.\par \par \par Plan:\par \par - Continue to work on stretching and strengthening of surgical leg.\par - Continue with a life long daily home exercise or fitness center program.\par - Follow-up 1 year after date of surgery with X-rays. Bilateral Standing AP, Lateral, Merchant Views.

## 2023-07-12 NOTE — HISTORY OF PRESENT ILLNESS
[de-identified] : Ba is a 57 year old male who presents today for a post operative visit. He states he is feeling really good. only occational little aches her and there. \par \par Post op Day: 6 months\par Surgery Type: Medial Partial Knee Arthroscopy \par Side of Surgery: Right\par Date of Surgery: 12.20.22\par \par Pain Level: 1\par Assistive Device: none\par Satisfaction Level: Very Satisfied\par \par Activities: Walking, physical activity and exercising.

## 2023-07-12 NOTE — PHYSICAL EXAM
[de-identified] : Right Knee Exam: \par \par Incision: Healed medial parapatellar incision. \par \par Incision Pin Sites: Healed tibial pin site incision. Healed femoral pin site incision. \par \par Skin:  Healthy appearing. No erythema.  No ecchymosis.  No drainage. \par                 \par Knee Joint Swelling: No Swelling. \par \par Alignment: 1.5 degrees of Varus on the right and 4 degrees of varus on the left.  \par                                                       \par ROM Extension: 0 degrees.   \par ROM Flexion: 135-140 degrees.   \par \par Medial Collateral Ligament Laxity:  Normal laxity. Good endpoint. \par \par Lateral Collateral Ligament Laxity: Normal laxity. Good endpoint. \par \par Instability: No flexion or extension instability\par \par Anterior Drawer Test: No significant translation. Good endpoint. \par Posterior Drawer Test:  Stable with good endpoint \par \par Motor Strength: 	\par Quadriceps strength is 5 out of 5 \par Hamstring strength is 5 out of 5 \par Ankle dorsiflexion strength is 5 out of 5 \par Ankle plantarflexion strength is 5 out of 5 \par \par Sensation:  \par Light tough sensation in the lower extremity is grossly normal.  \par Sensory change is located lateral to incision as expected. \par \par Pulses: \par Pulses are palpable at the ankle at the Dorsalis Pedis Artery.  \par Pulses are palpable at the Posterior Tibialis Artery. \par                                                                 \par Gait:  Normal gait.\par  \par Assistive Devices: None.\par \par  [de-identified] : Post Op  X-Rays Partial Knee:\par \par Examination of the Knee: Right\par \par Views: AP, Lateral, Merchant, Hip to Ankle \par \par \par AP Standing View:\par Medial Partial Knee Replacement in good position.\par No signs of Loosening.\par No subsidence.\par No fracture.\par Good insert space thickness.\par \par Lateral View: \par Medial  Partial  Knee Replacement in good position.\par Fix is anatomic on both femur and tibia.\par \par [place] View: \par Patellofemoral joint shows central tracking.\par Medial implant tracking is central on the tibial implant.\par \par Bilateral Hip to Ankle Standing View:\par Knee Alignment is in neutral position with 1 degree of varus.

## 2024-01-09 ENCOUNTER — RESULT REVIEW (OUTPATIENT)
Age: 59
End: 2024-01-09

## 2024-01-10 ENCOUNTER — APPOINTMENT (OUTPATIENT)
Dept: ORTHOPEDIC SURGERY | Facility: CLINIC | Age: 59
End: 2024-01-10
Payer: COMMERCIAL

## 2024-01-10 ENCOUNTER — OUTPATIENT (OUTPATIENT)
Dept: OUTPATIENT SERVICES | Facility: HOSPITAL | Age: 59
LOS: 1 days | End: 2024-01-10
Payer: COMMERCIAL

## 2024-01-10 VITALS
DIASTOLIC BLOOD PRESSURE: 82 MMHG | HEIGHT: 70 IN | BODY MASS INDEX: 25.77 KG/M2 | OXYGEN SATURATION: 97 % | WEIGHT: 180 LBS | HEART RATE: 65 BPM | SYSTOLIC BLOOD PRESSURE: 121 MMHG

## 2024-01-10 DIAGNOSIS — M17.12 UNILATERAL PRIMARY OSTEOARTHRITIS, LEFT KNEE: ICD-10-CM

## 2024-01-10 DIAGNOSIS — Z96.651 PRESENCE OF RIGHT ARTIFICIAL KNEE JOINT: ICD-10-CM

## 2024-01-10 DIAGNOSIS — Z47.1 AFTERCARE FOLLOWING JOINT REPLACEMENT SURGERY: ICD-10-CM

## 2024-01-10 DIAGNOSIS — Z96.651 AFTERCARE FOLLOWING JOINT REPLACEMENT SURGERY: ICD-10-CM

## 2024-01-10 PROCEDURE — 73564 X-RAY EXAM KNEE 4 OR MORE: CPT

## 2024-01-10 PROCEDURE — 73564 X-RAY EXAM KNEE 4 OR MORE: CPT | Mod: 26,50,59

## 2024-01-10 PROCEDURE — 77073 BONE LENGTH STUDIES: CPT

## 2024-01-10 PROCEDURE — 99214 OFFICE O/P EST MOD 30 MIN: CPT

## 2024-01-10 PROCEDURE — 77073 BONE LENGTH STUDIES: CPT | Mod: 26

## 2024-01-10 NOTE — PHYSICAL EXAM
[de-identified] : Right Knee Exam:   Incision: Healed medial parapatellar incision.  Incision Pin Sites: Healed tibial pin site incision. Healed femoral pin site incision.  Skin:  Healthy appearing. No erythema.  No ecchymosis.  No drainage.                Knee Joint Swelling: No Swelling.  Alignment: 1.5 degrees of Varus on the right and 4 degrees of varus on the left.                                                          ROM Extension: 0 degrees.    ROM Flexion: 140 degrees.     Medial Collateral Ligament Laxity:  Normal laxity. Good endpoint.  Lateral Collateral Ligament Laxity: Normal laxity. Good endpoint.   Instability: No flexion or extension instability  Anterior Drawer Test: No significant translation. Good endpoint.  Posterior Drawer Test:  Stable with good endpoint   Motor Strength: 	 Quadriceps strength is 5 out of 5  Hamstring strength is 5 out of 5  Ankle dorsiflexion strength is 5 out of 5  Ankle plantarflexion strength is 5 out of 5   Sensation:   Light tough sensation in the lower extremity is grossly normal.   Sensory change is located lateral to incision as expected.   Pulses:  Pulses are palpable at the ankle at the Dorsalis Pedis Artery.   Pulses are palpable at the Posterior Tibialis Artery.                                                                   Gait:  Normal gait.   Assistive Devices: None.   Left Knee Exam Mild to moderate opening of the medial joint space with valgus stres Mild medial joint line tenderness.  Intact ACL PCL No effusion ROM 0-135   [de-identified] :  X-Rays Partial Knee:  Examination of the Knee: Right Views: AP, Lateral, Merchant, Hip to Ankle    AP Standing View: Medial Partial Knee Replacement in good position. No signs of Loosening. No subsidence. No fracture. Good insert space thickness.  Lateral View:  Medial  Partial  Knee Replacement in good position. Fix is anatomic on both femur and tibia.  [place] View:  Patellofemoral joint shows central tracking. Medial implant tracking is central on the tibial implant.  Bilateral Hip to Ankle Standing View: Knee Alignment is in neutral position with 2 degree of varus on the right and 5 degrees on the left..   Left Knee shows moderate joint space loss medially on the flexion weight bearing views. Lateral and PF joint spaces well preserved.

## 2024-01-10 NOTE — HISTORY OF PRESENT ILLNESS
[de-identified] : Ba is a 58 year old male who presents today 1 year s/p right medial partial knee arthroplasty on 12.20.22 at . He states he is feeling very good. He is playing platform tennis and going to the gym and has no right knee pain. He does describe the expected numbness lateral to the incision that all patients have. He is going to try skiing again this year and get back into some of his running.  The left knee has moderate medial compartment arthrtis but it is tolerable still. He is very happy with his result on the right knee.

## 2024-01-10 NOTE — REASON FOR VISIT
[Follow-Up Visit] : a follow-up visit for [[email or web address]] : right medial partial knee arthroplasty on 12.20.22 at [facility]

## 2024-12-24 PROBLEM — F10.90 ALCOHOL USE: Status: ACTIVE | Noted: 2022-08-18

## 2025-01-07 ENCOUNTER — NON-APPOINTMENT (OUTPATIENT)
Age: 60
End: 2025-01-07

## 2025-01-08 ENCOUNTER — APPOINTMENT (OUTPATIENT)
Dept: RADIOLOGY | Facility: CLINIC | Age: 60
End: 2025-01-08

## 2025-01-08 ENCOUNTER — OUTPATIENT (OUTPATIENT)
Dept: OUTPATIENT SERVICES | Facility: HOSPITAL | Age: 60
LOS: 1 days | End: 2025-01-08
Payer: COMMERCIAL

## 2025-01-08 ENCOUNTER — APPOINTMENT (OUTPATIENT)
Dept: ORTHOPEDIC SURGERY | Facility: CLINIC | Age: 60
End: 2025-01-08
Payer: COMMERCIAL

## 2025-01-08 ENCOUNTER — NON-APPOINTMENT (OUTPATIENT)
Age: 60
End: 2025-01-08

## 2025-01-08 VITALS
SYSTOLIC BLOOD PRESSURE: 122 MMHG | DIASTOLIC BLOOD PRESSURE: 84 MMHG | HEIGHT: 70 IN | HEART RATE: 65 BPM | BODY MASS INDEX: 25.77 KG/M2 | WEIGHT: 180 LBS | OXYGEN SATURATION: 97 %

## 2025-01-08 DIAGNOSIS — Z47.1 AFTERCARE FOLLOWING JOINT REPLACEMENT SURGERY: ICD-10-CM

## 2025-01-08 DIAGNOSIS — Z96.651 AFTERCARE FOLLOWING JOINT REPLACEMENT SURGERY: ICD-10-CM

## 2025-01-08 DIAGNOSIS — Z96.651 PRESENCE OF RIGHT ARTIFICIAL KNEE JOINT: ICD-10-CM

## 2025-01-08 PROCEDURE — 77073 BONE LENGTH STUDIES: CPT

## 2025-01-08 PROCEDURE — 77073 BONE LENGTH STUDIES: CPT | Mod: 26

## 2025-01-08 PROCEDURE — 99213 OFFICE O/P EST LOW 20 MIN: CPT

## 2025-01-08 PROCEDURE — 73564 X-RAY EXAM KNEE 4 OR MORE: CPT

## 2025-01-08 PROCEDURE — 73564 X-RAY EXAM KNEE 4 OR MORE: CPT | Mod: 26,LT

## 2025-07-23 ENCOUNTER — APPOINTMENT (OUTPATIENT)
Dept: RADIOLOGY | Facility: CLINIC | Age: 60
End: 2025-07-23

## 2025-07-23 ENCOUNTER — OUTPATIENT (OUTPATIENT)
Dept: OUTPATIENT SERVICES | Facility: HOSPITAL | Age: 60
LOS: 1 days | End: 2025-07-23
Payer: COMMERCIAL

## 2025-07-23 ENCOUNTER — APPOINTMENT (OUTPATIENT)
Dept: ORTHOPEDIC SURGERY | Facility: CLINIC | Age: 60
End: 2025-07-23
Payer: COMMERCIAL

## 2025-07-23 DIAGNOSIS — S89.92XA UNSPECIFIED INJURY OF LEFT LOWER LEG, INITIAL ENCOUNTER: ICD-10-CM

## 2025-07-23 DIAGNOSIS — Z96.651 AFTERCARE FOLLOWING JOINT REPLACEMENT SURGERY: ICD-10-CM

## 2025-07-23 DIAGNOSIS — Z96.651 PRESENCE OF RIGHT ARTIFICIAL KNEE JOINT: ICD-10-CM

## 2025-07-23 DIAGNOSIS — Z47.1 AFTERCARE FOLLOWING JOINT REPLACEMENT SURGERY: ICD-10-CM

## 2025-07-23 PROCEDURE — 73564 X-RAY EXAM KNEE 4 OR MORE: CPT | Mod: 26,50,59

## 2025-07-23 PROCEDURE — 77073 BONE LENGTH STUDIES: CPT

## 2025-07-23 PROCEDURE — 73564 X-RAY EXAM KNEE 4 OR MORE: CPT

## 2025-07-23 PROCEDURE — 77073 BONE LENGTH STUDIES: CPT | Mod: 26

## 2025-07-23 PROCEDURE — 99213 OFFICE O/P EST LOW 20 MIN: CPT

## 2025-07-25 PROBLEM — S89.92XA: Status: ACTIVE | Noted: 2025-07-25
